# Patient Record
Sex: FEMALE | Race: WHITE | NOT HISPANIC OR LATINO | Employment: UNEMPLOYED | URBAN - METROPOLITAN AREA
[De-identification: names, ages, dates, MRNs, and addresses within clinical notes are randomized per-mention and may not be internally consistent; named-entity substitution may affect disease eponyms.]

---

## 2021-12-28 ENCOUNTER — HOSPITAL ENCOUNTER (OUTPATIENT)
Facility: MEDICAL CENTER | Age: 46
End: 2021-12-28
Attending: NURSE PRACTITIONER
Payer: MEDICAID

## 2021-12-28 ENCOUNTER — OFFICE VISIT (OUTPATIENT)
Dept: URGENT CARE | Facility: PHYSICIAN GROUP | Age: 46
End: 2021-12-28
Payer: MEDICAID

## 2021-12-28 VITALS
HEIGHT: 67 IN | HEART RATE: 94 BPM | DIASTOLIC BLOOD PRESSURE: 74 MMHG | TEMPERATURE: 97.4 F | WEIGHT: 178 LBS | OXYGEN SATURATION: 99 % | RESPIRATION RATE: 16 BRPM | BODY MASS INDEX: 27.94 KG/M2 | SYSTOLIC BLOOD PRESSURE: 114 MMHG

## 2021-12-28 DIAGNOSIS — R52 BODY ACHES: ICD-10-CM

## 2021-12-28 DIAGNOSIS — J06.9 UPPER RESPIRATORY INFECTION, ACUTE: Primary | ICD-10-CM

## 2021-12-28 DIAGNOSIS — Z78.9 RECENT TRAVEL ON AIRCRAFT: ICD-10-CM

## 2021-12-28 DIAGNOSIS — J02.9 SORE THROAT: ICD-10-CM

## 2021-12-28 DIAGNOSIS — R09.89 RUNNY NOSE: ICD-10-CM

## 2021-12-28 DIAGNOSIS — R05.9 COUGH: ICD-10-CM

## 2021-12-28 LAB
FLUAV+FLUBV AG SPEC QL IA: NEGATIVE
INT CON NEG: NEGATIVE
INT CON POS: POSITIVE

## 2021-12-28 PROCEDURE — 99203 OFFICE O/P NEW LOW 30 MIN: CPT | Performed by: NURSE PRACTITIONER

## 2021-12-28 PROCEDURE — U0005 INFEC AGEN DETEC AMPLI PROBE: HCPCS

## 2021-12-28 PROCEDURE — 87804 INFLUENZA ASSAY W/OPTIC: CPT | Performed by: NURSE PRACTITIONER

## 2021-12-28 PROCEDURE — U0003 INFECTIOUS AGENT DETECTION BY NUCLEIC ACID (DNA OR RNA); SEVERE ACUTE RESPIRATORY SYNDROME CORONAVIRUS 2 (SARS-COV-2) (CORONAVIRUS DISEASE [COVID-19]), AMPLIFIED PROBE TECHNIQUE, MAKING USE OF HIGH THROUGHPUT TECHNOLOGIES AS DESCRIBED BY CMS-2020-01-R: HCPCS

## 2021-12-29 DIAGNOSIS — R05.9 COUGH: ICD-10-CM

## 2021-12-29 DIAGNOSIS — J06.9 UPPER RESPIRATORY INFECTION, ACUTE: ICD-10-CM

## 2021-12-29 DIAGNOSIS — R52 BODY ACHES: ICD-10-CM

## 2021-12-29 DIAGNOSIS — R09.89 RUNNY NOSE: ICD-10-CM

## 2021-12-29 DIAGNOSIS — Z78.9 RECENT TRAVEL ON AIRCRAFT: ICD-10-CM

## 2021-12-29 DIAGNOSIS — J02.9 SORE THROAT: ICD-10-CM

## 2021-12-29 LAB
COVID ORDER STATUS COVID19: NORMAL
SARS-COV-2 RNA RESP QL NAA+PROBE: DETECTED
SPECIMEN SOURCE: ABNORMAL

## 2021-12-29 NOTE — PROGRESS NOTES
"Teresita Suárez is a 46 y.o. female who presents for Coronavirus Screening (x 3 days sore throat, cough, runny nose, fever, body aches)      HPI this new problem.  Teresita is a 46-year-old female presents with                 request for Covid screening.  She has had 4 days of sore throat, cough, runny nose, fever, body aches.  She is just traveled from [facility].  Her sister and her son are also ill with similar symptoms.  She has had 2 vaccinations for COVID virus.  Treatments tried none.  No other aggravating or alleviating factors.  She denies chest pain, shortness of breath, leg swelling, diarrhea, or fever.  She says she was going to go get a home test and went to several stores but could not find a Covid antigen test anywhere.  She decided to come to urgent care instead.  Denies significant past medical histories.  Denies daily medication use.  Reports that she is generally very healthy is unusual for her to get ill.    ROS See HPI     Allergies:       Allergies   Allergen Reactions   • Lentils Hives   • Sulfa Drugs        PMSFS Hx:  History reviewed. No pertinent past medical history.  History reviewed. No pertinent surgical history.  History reviewed. No pertinent family history.  Social History     Tobacco Use   • Smoking status: Not on file   • Smokeless tobacco: Not on file   Substance Use Topics   • Alcohol use: Not on file       Problems:   There is no problem list on file for this patient.      Medications:   No current outpatient medications on file prior to visit.     No current facility-administered medications on file prior to visit.          Objective:     /74 (BP Location: Left arm, Patient Position: Sitting, BP Cuff Size: Adult long)   Pulse 94   Temp 36.3 °C (97.4 °F) (Temporal)   Resp 16   Ht 1.702 m (5' 7\")   Wt 80.7 kg (178 lb)   SpO2 99%   BMI 27.88 kg/m²     Physical Exam  Vitals and nursing note reviewed.   Constitutional:       General: She is not in acute distress.     " Appearance: Normal appearance. She is well-developed, well-groomed and normal weight. She is ill-appearing. She is not toxic-appearing.   HENT:      Head: Normocephalic and atraumatic.      Right Ear: Hearing, tympanic membrane, ear canal and external ear normal.      Left Ear: Hearing, tympanic membrane, ear canal and external ear normal.      Nose: Congestion and rhinorrhea present.      Right Sinus: No maxillary sinus tenderness or frontal sinus tenderness.      Left Sinus: No maxillary sinus tenderness or frontal sinus tenderness.      Mouth/Throat:      Lips: Pink.      Mouth: Mucous membranes are moist.      Pharynx: Uvula midline. Posterior oropharyngeal erythema present. No oropharyngeal exudate or uvula swelling.   Eyes:      General: Lids are normal.      Conjunctiva/sclera: Conjunctivae normal.      Pupils: Pupils are equal, round, and reactive to light.   Neck:      Trachea: Trachea and phonation normal.   Cardiovascular:      Rate and Rhythm: Normal rate and regular rhythm.      Pulses: Normal pulses.      Heart sounds: Normal heart sounds.   Pulmonary:      Effort: Pulmonary effort is normal. No tachypnea or respiratory distress.      Breath sounds: Normal breath sounds.   Chest:   Breasts:      Right: No supraclavicular adenopathy.      Left: No supraclavicular adenopathy.       Abdominal:      General: Bowel sounds are normal.      Tenderness: There is no abdominal tenderness.   Musculoskeletal:         General: Normal range of motion.      Cervical back: Full passive range of motion without pain, normal range of motion and neck supple.   Lymphadenopathy:      Head:      Right side of head: No tonsillar adenopathy.      Left side of head: No tonsillar adenopathy.      Cervical: No cervical adenopathy.      Upper Body:      Right upper body: No supraclavicular adenopathy.      Left upper body: No supraclavicular adenopathy.   Skin:     General: Skin is warm and dry.      Capillary Refill: Capillary  refill takes less than 2 seconds.      Findings: No rash.   Neurological:      General: No focal deficit present.      Mental Status: She is alert and oriented to person, place, and time.      GCS: GCS eye subscore is 4. GCS verbal subscore is 5. GCS motor subscore is 6.      Cranial Nerves: No cranial nerve deficit.      Sensory: Sensation is intact. No sensory deficit.      Motor: Motor function is intact.      Coordination: Coordination is intact.      Gait: Gait is intact. Gait normal.   Psychiatric:         Speech: Speech normal.         Behavior: Behavior normal. Behavior is cooperative.         Thought Content: Thought content normal.         Judgment: Judgment normal.         Assessment /Associated Orders:      1. Upper respiratory infection, acute  SARS-CoV-2 PCR (24 hour In-House): Collect NP swab in VTM    POCT Influenza A/B   2. Sore throat  SARS-CoV-2 PCR (24 hour In-House): Collect NP swab in VTM    POCT Influenza A/B   3. Cough  SARS-CoV-2 PCR (24 hour In-House): Collect NP swab in VTM    POCT Influenza A/B   4. Runny nose  SARS-CoV-2 PCR (24 hour In-House): Collect NP swab in VTM    POCT Influenza A/B   5. Recent travel on aircraft  SARS-CoV-2 PCR (24 hour In-House): Collect NP swab in VTM    POCT Influenza A/B   6. Body aches  SARS-CoV-2 PCR (24 hour In-House): Collect NP swab in VTM    POCT Influenza A/B       Medical Decision Making:    Pt is clinically stable at today's acute urgent care visit.  No acute distress noted. Appropriate for outpatient management at this time.   Acute problem today with uncertain prognosis.   Influenza POCT negative  COVID pcr - pending     Discussed that this illness was  most likely viral in nature. Did not see any evidence of a bacterial process. OTC medications can be used for symptomatic relief of symptoms. Follow manufacturers guidelines for dosing and instructions.     Quarantine per CDC guidelines  Keep well hydrated  OTC flonase. Dosage and directions per    OTC antihistamine of choice. Follow manufactures dosing and safety guidelines.   OTC anti-tussive medication of choice to help cough. Dosage and directions per .   Advised to follow-up with the primary care provider for recheck, reevaluation, and consideration of further management if necessary.   Discussed management options (risks,benefits, and alternatives to treatment). Expressed understanding and the treatment plan was agreed upon. Questions were encouraged and answered   Return to urgent care prn if new or worsening sx or if there is no improvement in condition prn.    Educated in Red flags and indications to immediately call 911 or present to the Emergency Department.     I personally reviewed prior external notes and test results pertinent to today's visit.  I have independently reviewed and interpreted all diagnostics ordered during this urgent care acute visit.   Time spent evaluating this patient was at least 30 minutes and includes preparing for visit, counseling/education, exam and evaluation, obtaining history, independent interpretation, ordering lab/test/procedures,medication management and documentation.Time does not include separately billable procedures noted .